# Patient Record
Sex: MALE | Race: WHITE | NOT HISPANIC OR LATINO | Employment: OTHER | URBAN - METROPOLITAN AREA
[De-identification: names, ages, dates, MRNs, and addresses within clinical notes are randomized per-mention and may not be internally consistent; named-entity substitution may affect disease eponyms.]

---

## 2020-12-04 ENCOUNTER — NEW REFERRAL (OUTPATIENT)
Dept: URBAN - METROPOLITAN AREA CLINIC 26 | Facility: CLINIC | Age: 80
End: 2020-12-04

## 2020-12-04 VITALS
BODY MASS INDEX: 25.71 KG/M2 | WEIGHT: 160 LBS | HEART RATE: 53 BPM | SYSTOLIC BLOOD PRESSURE: 152 MMHG | DIASTOLIC BLOOD PRESSURE: 72 MMHG | HEIGHT: 66 IN

## 2020-12-04 DIAGNOSIS — H35.3132: ICD-10-CM

## 2020-12-04 DIAGNOSIS — H40.033: ICD-10-CM

## 2020-12-04 PROCEDURE — 92250 FUNDUS PHOTOGRAPHY W/I&R: CPT

## 2020-12-04 PROCEDURE — 92134 CPTRZ OPH DX IMG PST SGM RTA: CPT

## 2020-12-04 PROCEDURE — 99204 OFFICE O/P NEW MOD 45 MIN: CPT

## 2020-12-04 ASSESSMENT — VISUAL ACUITY
OD_CC: 20/20-2
OS_CC: 20/20-1

## 2020-12-04 ASSESSMENT — TONOMETRY
OD_IOP_MMHG: 18
OS_IOP_MMHG: 18

## 2021-01-15 ENCOUNTER — FOLLOW UP (OUTPATIENT)
Dept: URBAN - METROPOLITAN AREA CLINIC 26 | Facility: CLINIC | Age: 81
End: 2021-01-15

## 2021-01-15 DIAGNOSIS — H40.033: ICD-10-CM

## 2021-01-15 DIAGNOSIS — H35.3132: ICD-10-CM

## 2021-01-15 PROCEDURE — 92250 FUNDUS PHOTOGRAPHY W/I&R: CPT

## 2021-01-15 PROCEDURE — 92014 COMPRE OPH EXAM EST PT 1/>: CPT

## 2021-01-15 ASSESSMENT — TONOMETRY
OS_IOP_MMHG: 13
OD_IOP_MMHG: 16

## 2021-01-15 ASSESSMENT — VISUAL ACUITY
OD_CC: 20/30
OS_PH: 20/20
OS_CC: 20/40
OD_PH: 20/20

## 2021-04-13 ENCOUNTER — FOLLOW UP (OUTPATIENT)
Dept: URBAN - METROPOLITAN AREA CLINIC 26 | Facility: CLINIC | Age: 81
End: 2021-04-13

## 2021-04-13 DIAGNOSIS — H40.033: ICD-10-CM

## 2021-04-13 DIAGNOSIS — Z98.890: ICD-10-CM

## 2021-04-13 DIAGNOSIS — H35.3132: ICD-10-CM

## 2021-04-13 PROCEDURE — 92014 COMPRE OPH EXAM EST PT 1/>: CPT

## 2021-04-13 PROCEDURE — 92134 CPTRZ OPH DX IMG PST SGM RTA: CPT

## 2021-04-13 PROCEDURE — 92250 FUNDUS PHOTOGRAPHY W/I&R: CPT

## 2021-04-13 ASSESSMENT — VISUAL ACUITY
OS_CC: 20/20
OD_CC: 20/25-2

## 2021-04-13 ASSESSMENT — TONOMETRY
OD_IOP_MMHG: 12
OS_IOP_MMHG: 13

## 2021-08-17 ENCOUNTER — FOLLOW UP (OUTPATIENT)
Dept: URBAN - METROPOLITAN AREA CLINIC 26 | Facility: CLINIC | Age: 81
End: 2021-08-17

## 2021-08-17 DIAGNOSIS — H35.3132: ICD-10-CM

## 2021-08-17 DIAGNOSIS — H40.033: ICD-10-CM

## 2021-08-17 PROCEDURE — 92250 FUNDUS PHOTOGRAPHY W/I&R: CPT

## 2021-08-17 PROCEDURE — 92014 COMPRE OPH EXAM EST PT 1/>: CPT

## 2021-08-17 PROCEDURE — 92134 CPTRZ OPH DX IMG PST SGM RTA: CPT

## 2021-08-17 ASSESSMENT — TONOMETRY
OD_IOP_MMHG: 13
OS_IOP_MMHG: 12

## 2021-08-17 ASSESSMENT — VISUAL ACUITY
OS_CC: 20/20+2
OD_CC: 20/25-2

## 2022-02-15 ENCOUNTER — FOLLOW UP (OUTPATIENT)
Dept: URBAN - METROPOLITAN AREA CLINIC 26 | Facility: CLINIC | Age: 82
End: 2022-02-15

## 2022-02-15 VITALS — WEIGHT: 156 LBS | BODY MASS INDEX: 25.07 KG/M2 | HEIGHT: 66 IN

## 2022-02-15 DIAGNOSIS — H40.033: ICD-10-CM

## 2022-02-15 DIAGNOSIS — H35.3132: ICD-10-CM

## 2022-02-15 DIAGNOSIS — H04.123: ICD-10-CM

## 2022-02-15 PROCEDURE — 92014 COMPRE OPH EXAM EST PT 1/>: CPT

## 2022-02-15 PROCEDURE — 92134 CPTRZ OPH DX IMG PST SGM RTA: CPT

## 2022-02-15 PROCEDURE — 92250 FUNDUS PHOTOGRAPHY W/I&R: CPT

## 2022-02-15 ASSESSMENT — VISUAL ACUITY
OD_CC: 20/25-1
OS_CC: 20/20-1

## 2022-02-15 ASSESSMENT — TONOMETRY
OS_IOP_MMHG: 13
OD_IOP_MMHG: 12

## 2022-07-09 ENCOUNTER — TELEPHONE ENCOUNTER (OUTPATIENT)
Dept: URBAN - METROPOLITAN AREA CLINIC 121 | Facility: CLINIC | Age: 82
End: 2022-07-09

## 2022-07-09 RX ORDER — MULTIVIT-MIN/FA/LYCOPEN/LUTEIN .4-300-25
TABLET ORAL ONCE A DAY
Refills: 0 | OUTPATIENT
Start: 2014-04-17 | End: 2018-07-09

## 2022-07-09 RX ORDER — MULTIVIT-MIN/FA/LYCOPEN/LUTEIN .4-300-25
TABLET ORAL ONCE A DAY
Refills: 0 | OUTPATIENT
Start: 2014-02-18 | End: 2014-04-17

## 2022-07-09 RX ORDER — ROSUVASTATIN CALCIUM 5 MG
TABLET ORAL ONCE A DAY
Refills: 0 | OUTPATIENT
Start: 2014-04-17 | End: 2018-07-09

## 2022-07-09 RX ORDER — ROSUVASTATIN CALCIUM 5 MG
TABLET ORAL ONCE A DAY
Refills: 0 | OUTPATIENT
Start: 2014-02-18 | End: 2014-04-17

## 2022-07-09 RX ORDER — ASPIRIN 81 MG/1
TABLET, COATED ORAL TAKE AS DIRECTED
Refills: 0 | OUTPATIENT
Start: 2014-02-18 | End: 2014-04-17

## 2022-07-09 RX ORDER — TAMSULOSIN HYDROCHLORIDE 0.4 MG/1
CAPSULE ORAL ONCE A DAY
Refills: 0 | OUTPATIENT
Start: 2014-02-18 | End: 2014-04-17

## 2022-07-09 RX ORDER — ASPIRIN 81 MG/1
TABLET, COATED ORAL TAKE AS DIRECTED
Refills: 0 | OUTPATIENT
Start: 2014-04-17 | End: 2018-07-09

## 2022-07-09 RX ORDER — TAMSULOSIN HYDROCHLORIDE 0.4 MG/1
CAPSULE ORAL ONCE A DAY
Refills: 0 | OUTPATIENT
Start: 2014-04-17 | End: 2018-07-09

## 2022-07-09 RX ORDER — SODIUM SULFATE, POTASSIUM SULFATE, MAGNESIUM SULFATE 17.5; 3.13; 1.6 G/ML; G/ML; G/ML
SOLUTION, CONCENTRATE ORAL
Refills: 0 | OUTPATIENT
Start: 2014-02-18 | End: 2014-04-17

## 2022-07-10 ENCOUNTER — TELEPHONE ENCOUNTER (OUTPATIENT)
Dept: URBAN - METROPOLITAN AREA CLINIC 121 | Facility: CLINIC | Age: 82
End: 2022-07-10

## 2022-07-10 RX ORDER — OMEGA-3S/DHA/EPA/FISH OIL 120-180-60
CAPSULE,DELAYED RELEASE (ENTERIC COATED) ORAL TWICE A DAY
Refills: 0 | Status: ACTIVE | COMMUNITY
Start: 2018-07-09

## 2022-07-10 RX ORDER — ASPIRIN 81 MG/1
TABLET, DELAYED RELEASE ORAL ONCE A DAY
Refills: 0 | Status: ACTIVE | COMMUNITY
Start: 2018-07-09

## 2022-07-10 RX ORDER — ATENOLOL 25 MG/1
TABLET ORAL ONCE A DAY
Refills: 0 | Status: ACTIVE | COMMUNITY
Start: 2018-07-09

## 2022-07-10 RX ORDER — ATORVASTATIN CALCIUM 40 MG/1
TABLET, FILM COATED ORAL ONCE A DAY
Refills: 0 | Status: ACTIVE | COMMUNITY
Start: 2018-07-09

## 2022-07-10 RX ORDER — VIT C/E/ZN/COPPR/LUTEIN/ZEAXAN 60 MG-6 MG
CAPSULE ORAL ONCE A DAY
Refills: 0 | Status: ACTIVE | COMMUNITY
Start: 2018-07-09

## 2022-07-10 RX ORDER — MULTIVIT-MIN/FA/LYCOPEN/LUTEIN .4-300-25
TABLET ORAL ONCE A DAY
Refills: 0 | Status: ACTIVE | COMMUNITY
Start: 2018-07-09

## 2022-07-10 RX ORDER — TAMSULOSIN HYDROCHLORIDE 0.4 MG/1
CAPSULE ORAL ONCE A DAY
Refills: 0 | Status: ACTIVE | COMMUNITY
Start: 2018-07-09

## 2022-08-16 ENCOUNTER — FOLLOW UP (OUTPATIENT)
Dept: URBAN - METROPOLITAN AREA CLINIC 26 | Facility: CLINIC | Age: 82
End: 2022-08-16

## 2022-08-16 DIAGNOSIS — H35.3132: ICD-10-CM

## 2022-08-16 DIAGNOSIS — H40.033: ICD-10-CM

## 2022-08-16 DIAGNOSIS — H04.123: ICD-10-CM

## 2022-08-16 PROCEDURE — 92250 FUNDUS PHOTOGRAPHY W/I&R: CPT

## 2022-08-16 PROCEDURE — 92014 COMPRE OPH EXAM EST PT 1/>: CPT

## 2022-08-16 PROCEDURE — 92134 CPTRZ OPH DX IMG PST SGM RTA: CPT

## 2022-08-16 ASSESSMENT — VISUAL ACUITY
OS_CC: 20/20
OD_CC: 20/25-2

## 2022-08-16 ASSESSMENT — TONOMETRY
OS_IOP_MMHG: 17
OD_IOP_MMHG: 16

## 2023-08-22 ENCOUNTER — FOLLOW UP (OUTPATIENT)
Dept: URBAN - METROPOLITAN AREA CLINIC 26 | Facility: CLINIC | Age: 83
End: 2023-08-22

## 2023-08-22 DIAGNOSIS — H40.033: ICD-10-CM

## 2023-08-22 DIAGNOSIS — H43.812: ICD-10-CM

## 2023-08-22 DIAGNOSIS — H35.3132: ICD-10-CM

## 2023-08-22 DIAGNOSIS — H04.123: ICD-10-CM

## 2023-08-22 PROCEDURE — 92014 COMPRE OPH EXAM EST PT 1/>: CPT

## 2023-08-22 PROCEDURE — 92134 CPTRZ OPH DX IMG PST SGM RTA: CPT

## 2023-08-22 PROCEDURE — 92250 FUNDUS PHOTOGRAPHY W/I&R: CPT

## 2023-08-22 ASSESSMENT — VISUAL ACUITY
OS_CC: 20/20
OD_CC: 20/30

## 2023-08-22 ASSESSMENT — TONOMETRY
OS_IOP_MMHG: 15
OD_IOP_MMHG: 14

## 2024-02-27 ENCOUNTER — FOLLOW UP (OUTPATIENT)
Dept: URBAN - METROPOLITAN AREA CLINIC 26 | Facility: CLINIC | Age: 84
End: 2024-02-27

## 2024-02-27 VITALS
BODY MASS INDEX: 25.61 KG/M2 | WEIGHT: 150 LBS | DIASTOLIC BLOOD PRESSURE: 82 MMHG | HEART RATE: 53 BPM | SYSTOLIC BLOOD PRESSURE: 152 MMHG | HEIGHT: 64 IN

## 2024-02-27 DIAGNOSIS — H02.831: ICD-10-CM

## 2024-02-27 DIAGNOSIS — H02.834: ICD-10-CM

## 2024-02-27 DIAGNOSIS — H25.13: ICD-10-CM

## 2024-02-27 DIAGNOSIS — H40.033: ICD-10-CM

## 2024-02-27 DIAGNOSIS — Z98.890: ICD-10-CM

## 2024-02-27 DIAGNOSIS — H04.123: ICD-10-CM

## 2024-02-27 DIAGNOSIS — H35.3132: ICD-10-CM

## 2024-02-27 DIAGNOSIS — H43.812: ICD-10-CM

## 2024-02-27 DIAGNOSIS — H35.54: ICD-10-CM

## 2024-02-27 PROCEDURE — 92014 COMPRE OPH EXAM EST PT 1/>: CPT

## 2024-02-27 PROCEDURE — 92134 CPTRZ OPH DX IMG PST SGM RTA: CPT

## 2024-02-27 PROCEDURE — 92235 FLUORESCEIN ANGRPH MLTIFRAME: CPT

## 2024-02-27 PROCEDURE — 92250 FUNDUS PHOTOGRAPHY W/I&R: CPT

## 2024-02-27 ASSESSMENT — VISUAL ACUITY
OS_PH: 20/25+2
OS_CC: 20/30-1
OD_CC: 20/30-2

## 2024-02-27 ASSESSMENT — TONOMETRY
OD_IOP_MMHG: 16
OS_IOP_MMHG: 24

## 2024-07-02 ENCOUNTER — COMPREHENSIVE EXAM (OUTPATIENT)
Dept: URBAN - METROPOLITAN AREA CLINIC 26 | Facility: CLINIC | Age: 84
End: 2024-07-02

## 2024-07-02 VITALS — SYSTOLIC BLOOD PRESSURE: 155 MMHG | HEART RATE: 60 BPM | HEIGHT: 60 IN | DIASTOLIC BLOOD PRESSURE: 78 MMHG

## 2024-07-02 DIAGNOSIS — Z98.890: ICD-10-CM

## 2024-07-02 DIAGNOSIS — I65.29: ICD-10-CM

## 2024-07-02 DIAGNOSIS — H35.54: ICD-10-CM

## 2024-07-02 DIAGNOSIS — H04.123: ICD-10-CM

## 2024-07-02 DIAGNOSIS — H02.834: ICD-10-CM

## 2024-07-02 DIAGNOSIS — H35.3132: ICD-10-CM

## 2024-07-02 DIAGNOSIS — Z96.1: ICD-10-CM

## 2024-07-02 DIAGNOSIS — H02.831: ICD-10-CM

## 2024-07-02 DIAGNOSIS — H40.033: ICD-10-CM

## 2024-07-02 DIAGNOSIS — H43.812: ICD-10-CM

## 2024-07-02 PROCEDURE — 92014 COMPRE OPH EXAM EST PT 1/>: CPT

## 2024-07-02 PROCEDURE — 92235 FLUORESCEIN ANGRPH MLTIFRAME: CPT

## 2024-07-02 PROCEDURE — 92134 CPTRZ OPH DX IMG PST SGM RTA: CPT

## 2024-07-02 PROCEDURE — 92250 FUNDUS PHOTOGRAPHY W/I&R: CPT

## 2024-07-02 ASSESSMENT — TONOMETRY
OS_IOP_MMHG: 10
OD_IOP_MMHG: 16

## 2024-07-02 ASSESSMENT — VISUAL ACUITY
OS_CC: 20/20-1
OD_CC: 20/30-2

## 2024-10-29 ENCOUNTER — P2P PATIENT RECORD (OUTPATIENT)
Age: 84
End: 2024-10-29

## 2024-10-29 ENCOUNTER — TELEPHONE ENCOUNTER (OUTPATIENT)
Dept: URBAN - METROPOLITAN AREA CLINIC 64 | Facility: CLINIC | Age: 84
End: 2024-10-29

## 2024-11-04 ENCOUNTER — LAB OUTSIDE AN ENCOUNTER (OUTPATIENT)
Dept: URBAN - METROPOLITAN AREA CLINIC 60 | Facility: CLINIC | Age: 84
End: 2024-11-04

## 2024-11-04 ENCOUNTER — DASHBOARD ENCOUNTERS (OUTPATIENT)
Age: 84
End: 2024-11-04

## 2024-11-04 ENCOUNTER — OFFICE VISIT (OUTPATIENT)
Dept: URBAN - METROPOLITAN AREA CLINIC 60 | Facility: CLINIC | Age: 84
End: 2024-11-04
Payer: MEDICARE

## 2024-11-04 VITALS
TEMPERATURE: 98.4 F | SYSTOLIC BLOOD PRESSURE: 120 MMHG | HEART RATE: 71 BPM | HEIGHT: 66 IN | DIASTOLIC BLOOD PRESSURE: 60 MMHG | WEIGHT: 141 LBS | OXYGEN SATURATION: 98 % | BODY MASS INDEX: 22.66 KG/M2

## 2024-11-04 DIAGNOSIS — R19.5 HEME POSITIVE STOOL: ICD-10-CM

## 2024-11-04 DIAGNOSIS — D64.89 ANEMIA DUE TO OTHER CAUSE, NOT CLASSIFIED: ICD-10-CM

## 2024-11-04 DIAGNOSIS — K21.9 GERD WITHOUT ESOPHAGITIS: ICD-10-CM

## 2024-11-04 PROBLEM — 266435005: Status: ACTIVE | Noted: 2024-11-04

## 2024-11-04 PROCEDURE — 99204 OFFICE O/P NEW MOD 45 MIN: CPT

## 2024-11-04 RX ORDER — ATORVASTATIN CALCIUM 40 MG/1
TABLET, FILM COATED ORAL
Qty: 90 TABLET | Status: ACTIVE | COMMUNITY

## 2024-11-04 RX ORDER — FENOFIBRATE 160 MG/1
TABLET ORAL
Qty: 90 TABLET | Status: ACTIVE | COMMUNITY

## 2024-11-04 RX ORDER — POLYETHYLENE GLYCOL 3350, SODIUM CHLORIDE, SODIUM BICARBONATE, POTASSIUM CHLORIDE 420; 11.2; 5.72; 1.48 G/4L; G/4L; G/4L; G/4L
AS DIRECTED POWDER, FOR SOLUTION ORAL ONCE
Qty: 4000 | Refills: 0 | OUTPATIENT
Start: 2024-11-04 | End: 2024-11-05

## 2024-11-04 RX ORDER — ATENOLOL 25 MG/1
TABLET ORAL
Qty: 90 TABLET | Status: ACTIVE | COMMUNITY

## 2024-11-04 RX ORDER — OMEPRAZOLE 40 MG/1
1 CAPSULE 30 MINUTES BEFORE MEAL CAPSULE, DELAYED RELEASE ORAL ONCE A DAY
Status: ACTIVE | COMMUNITY

## 2024-11-04 RX ORDER — FERROUS SULFATE TAB 325 MG (65 MG ELEMENTAL FE) 325 (65 FE) MG
TAKE ONE TABLET BY MOUTH ONE TIME DAILY FOR 90 DAYS TAB ORAL
Qty: 90 UNSPECIFIED | Refills: 0 | Status: ACTIVE | COMMUNITY

## 2024-11-04 RX ORDER — MV-MN/OM3/DHA/EPA/FISH/LUT/ZEA 250-5-1 MG
AS DIRECTED CAPSULE ORAL
Status: ACTIVE | COMMUNITY

## 2024-11-04 RX ORDER — TAMSULOSIN HYDROCHLORIDE 0.4 MG/1
CAPSULE ORAL
Qty: 90 CAPSULE | Status: ACTIVE | COMMUNITY

## 2024-11-04 NOTE — HPI-TODAY'S VISIT:
Patient is an 83-year-old male coming in for blood in the stool and anemia.  Last seen by SELVIN Moe 7/9/2018.  History of colon resection x 2 as well as colostomy bag but has since been taken down.  History of positive FOBT done by primary 10/29/2024. History of CKD stage 3 following with nephro.  Patient comes in accompanied by his wife.  He was referred by primary care and nephro for evaluation with endoscopies.  He had a positive FOBT as well as decreasing hemoglobin trended on last 3 labs.  He is asymptomatic.  No GI complaints.  Regular BMs.  He had a previous colonoscopy in 2014 dictated below.  No prior EGD to his recollection.  He had a recent papilloma removed from his Larynex and is recovering well.  Denies any use of blood thinners, presence of pacemaker or defib. Denies history of sleep apnea. No prior MI, TIA, CVA or cardiac stenting. No known cardiac or pulmonary issues.   Colonoscopy 4/3/2014 by Dr. Gonzalez showing 2 small polyps in the sigmoid colon, small polyp in the rectum, diverticulosis and nonbleeding internal hemorrhoids.  Polyps hyperplastic.  Labs 5/2/2024 CBC with hemoglobin 13.0, hematocrit 39.1, MCV 97.8, platelet 441  Labs 8/5/2024 Hemoglobin 12.4, hematocrit 38.0, MCV 97.4 and platelet 596 CMP with creatinine 1.45, albumin 3.7, alk phos 82, AST 31, ALT 18, total bili 0.6 and GFR 40.1  Labs 9/24/2024 Hemoglobin 11.8, hematocrit 36.1, MCV 96.3 and platelet 503

## 2024-11-07 ENCOUNTER — COMPREHENSIVE EXAM (OUTPATIENT)
Dept: URBAN - METROPOLITAN AREA CLINIC 26 | Facility: CLINIC | Age: 84
End: 2024-11-07

## 2024-11-07 ENCOUNTER — TELEPHONE ENCOUNTER (OUTPATIENT)
Dept: URBAN - METROPOLITAN AREA CLINIC 63 | Facility: CLINIC | Age: 84
End: 2024-11-07

## 2024-11-07 DIAGNOSIS — H35.81: ICD-10-CM

## 2024-11-07 DIAGNOSIS — H43.812: ICD-10-CM

## 2024-11-07 DIAGNOSIS — H02.831: ICD-10-CM

## 2024-11-07 DIAGNOSIS — H02.834: ICD-10-CM

## 2024-11-07 DIAGNOSIS — H04.123: ICD-10-CM

## 2024-11-07 DIAGNOSIS — H35.3132: ICD-10-CM

## 2024-11-07 DIAGNOSIS — Z98.890: ICD-10-CM

## 2024-11-07 DIAGNOSIS — H40.033: ICD-10-CM

## 2024-11-07 DIAGNOSIS — H35.54: ICD-10-CM

## 2024-11-07 DIAGNOSIS — I65.29: ICD-10-CM

## 2024-11-07 DIAGNOSIS — Z96.1: ICD-10-CM

## 2024-11-07 PROCEDURE — 99214 OFFICE O/P EST MOD 30 MIN: CPT

## 2024-11-07 PROCEDURE — 92134 CPTRZ OPH DX IMG PST SGM RTA: CPT

## 2024-11-07 PROCEDURE — 92250 FUNDUS PHOTOGRAPHY W/I&R: CPT | Mod: 59

## 2024-11-27 ENCOUNTER — TELEPHONE ENCOUNTER (OUTPATIENT)
Dept: URBAN - METROPOLITAN AREA CLINIC 63 | Facility: CLINIC | Age: 84
End: 2024-11-27

## 2024-12-02 ENCOUNTER — CLAIMS CREATED FROM THE CLAIM WINDOW (OUTPATIENT)
Dept: URBAN - METROPOLITAN AREA SURGERY CENTER 4 | Facility: SURGERY CENTER | Age: 84
End: 2024-12-02
Payer: MEDICARE

## 2024-12-02 ENCOUNTER — CLAIMS CREATED FROM THE CLAIM WINDOW (OUTPATIENT)
Dept: URBAN - METROPOLITAN AREA CLINIC 4 | Facility: CLINIC | Age: 84
End: 2024-12-02
Payer: MEDICARE

## 2024-12-02 DIAGNOSIS — Z12.11 ENCOUNTER FOR SCREENING FOR MALIGNANT NEOPLASM OF COLON: ICD-10-CM

## 2024-12-02 DIAGNOSIS — K64.1 SECOND DEGREE HEMORRHOIDS: ICD-10-CM

## 2024-12-02 DIAGNOSIS — Z12.11 COLON CANCER SCREENING: ICD-10-CM

## 2024-12-02 DIAGNOSIS — K31.7 GASTRIC POLYP: ICD-10-CM

## 2024-12-02 DIAGNOSIS — D12.5 BENIGN NEOPLASM OF SIGMOID COLON: ICD-10-CM

## 2024-12-02 DIAGNOSIS — K57.30 DIVERTICULOSIS OF LARGE INTESTINE WITHOUT PERFORATION OR ABSCESS WITHOUT BLEEDING: ICD-10-CM

## 2024-12-02 DIAGNOSIS — K31.7 POLYP OF STOMACH AND DUODENUM: ICD-10-CM

## 2024-12-02 DIAGNOSIS — K63.5 POLYP OF SIGMOID COLON, UNSPECIFIED TYPE: ICD-10-CM

## 2024-12-02 DIAGNOSIS — K44.9 DIAPHRAGMATIC HERNIA WITHOUT OBSTRUCTION OR GANGRENE: ICD-10-CM

## 2024-12-02 DIAGNOSIS — K63.5 HYPERPLASTIC COLON POLYPS: ICD-10-CM

## 2024-12-02 DIAGNOSIS — K31.89 OTHER DISEASES OF STOMACH AND DUODENUM: ICD-10-CM

## 2024-12-02 DIAGNOSIS — K63.89 OTHER SPECIFIED DISEASES OF INTESTINE: ICD-10-CM

## 2024-12-02 DIAGNOSIS — K26.9 DUODENAL ULCER: ICD-10-CM

## 2024-12-02 DIAGNOSIS — K44.9 HIATAL HERNIA: ICD-10-CM

## 2024-12-02 DIAGNOSIS — K29.70 GASTRITIS, UNSPECIFIED, WITHOUT BLEEDING: ICD-10-CM

## 2024-12-02 PROCEDURE — 45385 COLONOSCOPY W/LESION REMOVAL: CPT | Performed by: INTERNAL MEDICINE

## 2024-12-02 PROCEDURE — 43239 EGD BIOPSY SINGLE/MULTIPLE: CPT | Performed by: INTERNAL MEDICINE

## 2024-12-02 PROCEDURE — 45385 COLONOSCOPY W/LESION REMOVAL: CPT | Performed by: CLINIC/CENTER

## 2024-12-02 PROCEDURE — 43239 EGD BIOPSY SINGLE/MULTIPLE: CPT | Performed by: CLINIC/CENTER

## 2024-12-02 PROCEDURE — 00813 ANES UPR LWR GI NDSC PX: CPT | Performed by: NURSE ANESTHETIST, CERTIFIED REGISTERED

## 2024-12-02 PROCEDURE — 45380 COLONOSCOPY AND BIOPSY: CPT | Performed by: INTERNAL MEDICINE

## 2024-12-02 PROCEDURE — 88305 TISSUE EXAM BY PATHOLOGIST: CPT | Performed by: STUDENT IN AN ORGANIZED HEALTH CARE EDUCATION/TRAINING PROGRAM

## 2024-12-02 PROCEDURE — 45380 COLONOSCOPY AND BIOPSY: CPT | Performed by: CLINIC/CENTER

## 2024-12-02 RX ORDER — OMEPRAZOLE 40 MG/1
1 CAPSULE 30 MINUTES BEFORE MEAL CAPSULE, DELAYED RELEASE ORAL ONCE A DAY
Status: ACTIVE | COMMUNITY

## 2024-12-02 RX ORDER — ATENOLOL 25 MG/1
TABLET ORAL
Qty: 90 TABLET | Status: ACTIVE | COMMUNITY

## 2024-12-02 RX ORDER — FENOFIBRATE 160 MG/1
TABLET ORAL
Qty: 90 TABLET | Status: ACTIVE | COMMUNITY

## 2024-12-02 RX ORDER — FERROUS SULFATE TAB 325 MG (65 MG ELEMENTAL FE) 325 (65 FE) MG
TAKE ONE TABLET BY MOUTH ONE TIME DAILY FOR 90 DAYS TAB ORAL
Qty: 90 UNSPECIFIED | Refills: 0 | Status: ACTIVE | COMMUNITY

## 2024-12-02 RX ORDER — TAMSULOSIN HYDROCHLORIDE 0.4 MG/1
CAPSULE ORAL
Qty: 90 CAPSULE | Status: ACTIVE | COMMUNITY

## 2024-12-02 RX ORDER — MV-MN/OM3/DHA/EPA/FISH/LUT/ZEA 250-5-1 MG
AS DIRECTED CAPSULE ORAL
Status: ACTIVE | COMMUNITY

## 2024-12-02 RX ORDER — ATORVASTATIN CALCIUM 40 MG/1
TABLET, FILM COATED ORAL
Qty: 90 TABLET | Status: ACTIVE | COMMUNITY

## 2024-12-18 ENCOUNTER — OFFICE VISIT (OUTPATIENT)
Dept: URBAN - METROPOLITAN AREA CLINIC 60 | Facility: CLINIC | Age: 84
End: 2024-12-18

## 2024-12-18 RX ORDER — ATENOLOL 25 MG/1
TABLET ORAL
Qty: 90 TABLET | COMMUNITY

## 2024-12-18 RX ORDER — FERROUS SULFATE TAB 325 MG (65 MG ELEMENTAL FE) 325 (65 FE) MG
TAKE ONE TABLET BY MOUTH ONE TIME DAILY FOR 90 DAYS TAB ORAL
Qty: 90 UNSPECIFIED | Refills: 0 | COMMUNITY

## 2024-12-18 RX ORDER — OMEPRAZOLE 40 MG/1
1 CAPSULE 30 MINUTES BEFORE MEAL CAPSULE, DELAYED RELEASE ORAL ONCE A DAY
COMMUNITY

## 2024-12-18 RX ORDER — ATORVASTATIN CALCIUM 40 MG/1
TABLET, FILM COATED ORAL
Qty: 90 TABLET | COMMUNITY

## 2024-12-18 RX ORDER — TAMSULOSIN HYDROCHLORIDE 0.4 MG/1
CAPSULE ORAL
Qty: 90 CAPSULE | COMMUNITY

## 2024-12-18 RX ORDER — FENOFIBRATE 160 MG/1
TABLET ORAL
Qty: 90 TABLET | COMMUNITY

## 2024-12-18 RX ORDER — MV-MN/OM3/DHA/EPA/FISH/LUT/ZEA 250-5-1 MG
AS DIRECTED CAPSULE ORAL
COMMUNITY

## 2024-12-20 ENCOUNTER — OFFICE VISIT (OUTPATIENT)
Dept: URBAN - METROPOLITAN AREA CLINIC 63 | Facility: CLINIC | Age: 84
End: 2024-12-20

## 2025-02-13 ENCOUNTER — OFFICE VISIT (OUTPATIENT)
Dept: URBAN - METROPOLITAN AREA SURGERY CENTER 4 | Facility: SURGERY CENTER | Age: 85
End: 2025-02-13

## 2025-02-27 ENCOUNTER — OFFICE VISIT (OUTPATIENT)
Dept: URBAN - METROPOLITAN AREA CLINIC 63 | Facility: CLINIC | Age: 85
End: 2025-02-27

## 2025-03-06 ENCOUNTER — COMPREHENSIVE EXAM (OUTPATIENT)
Age: 85
End: 2025-03-06

## 2025-03-06 VITALS — WEIGHT: 143 LBS | BODY MASS INDEX: 22.98 KG/M2 | HEIGHT: 66 IN

## 2025-03-06 DIAGNOSIS — I65.29: ICD-10-CM

## 2025-03-06 DIAGNOSIS — H04.123: ICD-10-CM

## 2025-03-06 DIAGNOSIS — H40.033: ICD-10-CM

## 2025-03-06 DIAGNOSIS — H35.81: ICD-10-CM

## 2025-03-06 DIAGNOSIS — H02.831: ICD-10-CM

## 2025-03-06 DIAGNOSIS — H43.812: ICD-10-CM

## 2025-03-06 DIAGNOSIS — H02.834: ICD-10-CM

## 2025-03-06 DIAGNOSIS — Z98.890: ICD-10-CM

## 2025-03-06 DIAGNOSIS — H35.3132: ICD-10-CM

## 2025-03-06 DIAGNOSIS — H35.54: ICD-10-CM

## 2025-03-06 DIAGNOSIS — Z96.1: ICD-10-CM

## 2025-03-06 PROCEDURE — 92250 FUNDUS PHOTOGRAPHY W/I&R: CPT | Mod: 59

## 2025-03-06 PROCEDURE — 99213 OFFICE O/P EST LOW 20 MIN: CPT

## 2025-03-06 PROCEDURE — 92134 CPTRZ OPH DX IMG PST SGM RTA: CPT

## 2025-08-21 ENCOUNTER — FOLLOW UP (OUTPATIENT)
Age: 85
End: 2025-08-21

## 2025-08-21 DIAGNOSIS — H35.3132: ICD-10-CM

## 2025-08-21 DIAGNOSIS — H43.812: ICD-10-CM

## 2025-08-21 DIAGNOSIS — H35.81: ICD-10-CM

## 2025-08-21 DIAGNOSIS — H02.831: ICD-10-CM

## 2025-08-21 DIAGNOSIS — Z98.890: ICD-10-CM

## 2025-08-21 DIAGNOSIS — H04.123: ICD-10-CM

## 2025-08-21 DIAGNOSIS — H02.834: ICD-10-CM

## 2025-08-21 DIAGNOSIS — H40.033: ICD-10-CM

## 2025-08-21 DIAGNOSIS — H35.54: ICD-10-CM

## 2025-08-21 DIAGNOSIS — I65.29: ICD-10-CM

## 2025-08-21 DIAGNOSIS — Z96.1: ICD-10-CM

## 2025-08-21 PROCEDURE — 92250 FUNDUS PHOTOGRAPHY W/I&R: CPT

## 2025-08-21 PROCEDURE — 92134 CPTRZ OPH DX IMG PST SGM RTA: CPT

## 2025-08-21 PROCEDURE — 99213 OFFICE O/P EST LOW 20 MIN: CPT
